# Patient Record
Sex: FEMALE | Race: WHITE | URBAN - METROPOLITAN AREA
[De-identification: names, ages, dates, MRNs, and addresses within clinical notes are randomized per-mention and may not be internally consistent; named-entity substitution may affect disease eponyms.]

---

## 2023-02-16 ENCOUNTER — OFFICE (OUTPATIENT)
Dept: URBAN - METROPOLITAN AREA CLINIC 35 | Facility: CLINIC | Age: 68
Setting detail: OPHTHALMOLOGY
End: 2023-02-16
Payer: MEDICARE

## 2023-02-16 DIAGNOSIS — Z96.1: ICD-10-CM

## 2023-02-16 DIAGNOSIS — H40.013: ICD-10-CM

## 2023-02-16 DIAGNOSIS — H40.053: ICD-10-CM

## 2023-02-16 DIAGNOSIS — H25.12: ICD-10-CM

## 2023-02-16 DIAGNOSIS — H43.391: ICD-10-CM

## 2023-02-16 PROCEDURE — 92014 COMPRE OPH EXAM EST PT 1/>: CPT | Performed by: OPHTHALMOLOGY

## 2023-02-16 ASSESSMENT — REFRACTION_MANIFEST
OS_CYLINDER: -0.50
OS_CYLINDER: -0.75
OD_AXIS: 100
OD_CYLINDER: -0.25
OS_AXIS: 090
OD_SPHERE: -0.50
OS_SPHERE: -0.25
OS_VA1: 20/25+3
OS_SPHERE: +2.25
OD_VA1: 20/NI
OD_AXIS: 85
OD_VA1: 20/20
OS_AXIS: 77
OD_CYLINDER: -1
OS_VA1: 20/25
OD_SPHERE: PLANO

## 2023-02-16 ASSESSMENT — KERATOMETRY
OD_AXISANGLE_DEGREES: 025
OS_K2POWER_DIOPTERS: 41.75
OS_AXISANGLE_DEGREES: 090
OD_K2POWER_DIOPTERS: 41.75
OS_K1POWER_DIOPTERS: 41.75
METHOD_AUTO_MANUAL: AUTO
OD_K1POWER_DIOPTERS: 41.25

## 2023-02-16 ASSESSMENT — SPHEQUIV_DERIVED
OS_SPHEQUIV: 2.75
OS_SPHEQUIV: 1.875
OS_SPHEQUIV: -0.5
OD_SPHEQUIV: -0.875
OD_SPHEQUIV: -1

## 2023-02-16 ASSESSMENT — REFRACTION_CURRENTRX
OD_SPHERE: +2.00
OD_OVR_VA: 20/
OS_SPHERE: +2.00
OD_VPRISM_DIRECTION: SV
OS_VPRISM_DIRECTION: SV
OS_OVR_VA: 20/

## 2023-02-16 ASSESSMENT — AXIALLENGTH_DERIVED
OD_AL: 24.7724
OD_AL: 24.7188
OS_AL: 24.4603
OS_AL: 23.1709
OS_AL: 23.5045

## 2023-02-16 ASSESSMENT — VISUAL ACUITY
OD_BCVA: 20/40-1
OS_BCVA: 20/20

## 2023-02-16 ASSESSMENT — CONFRONTATIONAL VISUAL FIELD TEST (CVF)
OS_FINDINGS: FULL
OD_FINDINGS: FULL

## 2023-02-16 ASSESSMENT — REFRACTION_AUTOREFRACTION
OD_AXIS: 083
OS_AXIS: 084
OS_SPHERE: +3.25
OS_CYLINDER: -1.00
OD_SPHERE: -0.25
OD_CYLINDER: -1.25

## 2023-02-16 ASSESSMENT — TONOMETRY: OD_IOP_MMHG: 20

## 2023-06-22 ENCOUNTER — OFFICE (OUTPATIENT)
Dept: URBAN - METROPOLITAN AREA CLINIC 35 | Facility: CLINIC | Age: 68
Setting detail: OPHTHALMOLOGY
End: 2023-06-22
Payer: MEDICARE

## 2023-06-22 DIAGNOSIS — H25.12: ICD-10-CM

## 2023-06-22 DIAGNOSIS — H40.013: ICD-10-CM

## 2023-06-22 DIAGNOSIS — Z96.1: ICD-10-CM

## 2023-06-22 DIAGNOSIS — H40.053: ICD-10-CM

## 2023-06-22 PROCEDURE — 99213 OFFICE O/P EST LOW 20 MIN: CPT | Performed by: OPHTHALMOLOGY

## 2023-06-22 ASSESSMENT — REFRACTION_AUTOREFRACTION
OD_AXIS: 083
OD_CYLINDER: -1.25
OD_SPHERE: -0.25
OS_CYLINDER: -1.00
OS_SPHERE: +3.25
OS_AXIS: 084

## 2023-06-22 ASSESSMENT — KERATOMETRY
OD_AXISANGLE_DEGREES: 025
OD_K1POWER_DIOPTERS: 41.25
METHOD_AUTO_MANUAL: AUTO
OS_K2POWER_DIOPTERS: 41.75
OS_AXISANGLE_DEGREES: 090
OS_K1POWER_DIOPTERS: 41.75
OD_K2POWER_DIOPTERS: 41.75

## 2023-06-22 ASSESSMENT — REFRACTION_MANIFEST
OS_VA1: 20/20-2
OS_SPHERE: +2.50
OD_CYLINDER: -0.25
OD_AXIS: 100
OS_VA1: 20/25+3
OS_VA1: 20/25
OS_CYLINDER: -0.75
OS_SPHERE: +2.25
OS_AXIS: 77
OS_CYLINDER: -0.50
OD_SPHERE: -0.50
OD_AXIS: 85
OD_CYLINDER: -1
OS_AXIS: 090
OS_SPHERE: -0.25
OS_CYLINDER: -1.00
OS_AXIS: 085
OD_VA1: 20/20
OD_VA1: 20/NI
OD_SPHERE: PLANO

## 2023-06-22 ASSESSMENT — VISUAL ACUITY
OD_BCVA: 20/50-2
OS_BCVA: 20/20-1

## 2023-06-22 ASSESSMENT — AXIALLENGTH_DERIVED
OD_AL: 24.7724
OS_AL: 24.4603
OS_AL: 23.1709
OS_AL: 23.4563
OD_AL: 24.7188
OS_AL: 23.5045

## 2023-06-22 ASSESSMENT — REFRACTION_CURRENTRX
OD_VPRISM_DIRECTION: SV
OS_SPHERE: +2.00
OD_SPHERE: +2.00
OS_OVR_VA: 20/
OD_OVR_VA: 20/
OS_VPRISM_DIRECTION: SV

## 2023-06-22 ASSESSMENT — CONFRONTATIONAL VISUAL FIELD TEST (CVF)
OS_FINDINGS: FULL
OD_FINDINGS: FULL

## 2023-06-22 ASSESSMENT — SPHEQUIV_DERIVED
OS_SPHEQUIV: 2.75
OS_SPHEQUIV: 1.875
OD_SPHEQUIV: -1
OD_SPHEQUIV: -0.875
OS_SPHEQUIV: 2
OS_SPHEQUIV: -0.5

## 2023-10-26 ENCOUNTER — OFFICE (OUTPATIENT)
Dept: URBAN - METROPOLITAN AREA CLINIC 35 | Facility: CLINIC | Age: 68
Setting detail: OPHTHALMOLOGY
End: 2023-10-26
Payer: MEDICARE

## 2023-10-26 DIAGNOSIS — H40.013: ICD-10-CM

## 2023-10-26 DIAGNOSIS — H25.12: ICD-10-CM

## 2023-10-26 DIAGNOSIS — Z96.1: ICD-10-CM

## 2023-10-26 DIAGNOSIS — H40.053: ICD-10-CM

## 2023-10-26 PROBLEM — H52.31 ANISOMETROPIA: Status: ACTIVE | Noted: 2023-10-26

## 2023-10-26 PROCEDURE — 92083 EXTENDED VISUAL FIELD XM: CPT | Performed by: OPHTHALMOLOGY

## 2023-10-26 PROCEDURE — 92012 INTRM OPH EXAM EST PATIENT: CPT | Performed by: OPHTHALMOLOGY

## 2023-10-26 PROCEDURE — 92133 CPTRZD OPH DX IMG PST SGM ON: CPT | Performed by: OPHTHALMOLOGY

## 2023-10-26 ASSESSMENT — REFRACTION_MANIFEST
OS_SPHERE: -0.25
OS_AXIS: 77
OD_AXIS: 85
OS_SPHERE: +2.50
OS_SPHERE: +2.25
OS_AXIS: 085
OS_VA1: 20/25+3
OS_CYLINDER: -0.50
OD_SPHERE: -0.50
OD_SPHERE: PLANO
OS_CYLINDER: -1.00
OS_VA1: 20/20-2
OD_AXIS: 100
OS_CYLINDER: -0.75
OS_VA1: 20/25-2
OS_AXIS: 090
OS_AXIS: 085
OD_CYLINDER: -0.25
OD_VA1: 20/20
OS_SPHERE: +3.25
OD_VA1: 20/NI
OD_CYLINDER: -1
OS_VA1: 20/25
OS_CYLINDER: -1.00

## 2023-10-26 ASSESSMENT — REFRACTION_CURRENTRX
OD_SPHERE: +2.00
OS_SPHERE: +2.00
OD_VPRISM_DIRECTION: SV
OD_OVR_VA: 20/
OS_VPRISM_DIRECTION: SV
OS_OVR_VA: 20/

## 2023-10-26 ASSESSMENT — REFRACTION_AUTOREFRACTION
OD_AXIS: 083
OS_AXIS: 084
OD_CYLINDER: -1.25
OS_CYLINDER: -1.00
OD_SPHERE: -0.25
OS_SPHERE: +3.25

## 2023-10-26 ASSESSMENT — AXIALLENGTH_DERIVED
OD_AL: 24.7188
OS_AL: 23.5045
OS_AL: 23.1709
OS_AL: 23.4563
OS_AL: 24.4603
OD_AL: 24.7724
OS_AL: 23.1709

## 2023-10-26 ASSESSMENT — KERATOMETRY
OD_K2POWER_DIOPTERS: 41.75
OS_K1POWER_DIOPTERS: 41.75
METHOD_AUTO_MANUAL: AUTO
OD_AXISANGLE_DEGREES: 025
OS_K2POWER_DIOPTERS: 41.75
OD_K1POWER_DIOPTERS: 41.25
OS_AXISANGLE_DEGREES: 090

## 2023-10-26 ASSESSMENT — VISUAL ACUITY
OD_BCVA: 20/80
OS_BCVA: 20/20-1

## 2023-10-26 ASSESSMENT — SPHEQUIV_DERIVED
OS_SPHEQUIV: -0.5
OD_SPHEQUIV: -1
OS_SPHEQUIV: 2.75
OS_SPHEQUIV: 1.875
OS_SPHEQUIV: 2
OS_SPHEQUIV: 2.75
OD_SPHEQUIV: -0.875

## 2023-11-15 ENCOUNTER — NON-APPOINTMENT (OUTPATIENT)
Age: 68
End: 2023-11-15

## 2023-11-15 DIAGNOSIS — Z98.890 OTHER SPECIFIED POSTPROCEDURAL STATES: ICD-10-CM

## 2023-11-15 DIAGNOSIS — Z78.9 OTHER SPECIFIED HEALTH STATUS: ICD-10-CM

## 2023-11-15 DIAGNOSIS — Z80.3 FAMILY HISTORY OF MALIGNANT NEOPLASM OF BREAST: ICD-10-CM

## 2023-11-15 DIAGNOSIS — H40.9 UNSPECIFIED GLAUCOMA: ICD-10-CM

## 2023-11-15 DIAGNOSIS — Z92.89 PERSONAL HISTORY OF OTHER MEDICAL TREATMENT: ICD-10-CM

## 2023-11-15 DIAGNOSIS — Z87.81 PERSONAL HISTORY OF (HEALED) TRAUMATIC FRACTURE: ICD-10-CM

## 2023-11-15 RX ORDER — CANDESARTAN CILEXETIL AND HYDROCHLOROTHIAZIDE 32; 12.5 MG/1; MG/1
32-12.5 TABLET ORAL DAILY
Refills: 0 | Status: ACTIVE | COMMUNITY

## 2023-12-04 ENCOUNTER — APPOINTMENT (OUTPATIENT)
Dept: INTERNAL MEDICINE | Facility: CLINIC | Age: 68
End: 2023-12-04

## 2024-02-02 ENCOUNTER — LABORATORY RESULT (OUTPATIENT)
Age: 69
End: 2024-02-02

## 2024-02-02 ENCOUNTER — NON-APPOINTMENT (OUTPATIENT)
Age: 69
End: 2024-02-02

## 2024-02-02 ENCOUNTER — APPOINTMENT (OUTPATIENT)
Dept: INTERNAL MEDICINE | Facility: CLINIC | Age: 69
End: 2024-02-02
Payer: MEDICARE

## 2024-02-02 VITALS
WEIGHT: 145.25 LBS | DIASTOLIC BLOOD PRESSURE: 89 MMHG | SYSTOLIC BLOOD PRESSURE: 134 MMHG | BODY MASS INDEX: 29.28 KG/M2 | HEART RATE: 68 BPM | OXYGEN SATURATION: 96 % | HEIGHT: 59 IN

## 2024-02-02 DIAGNOSIS — I65.29 OCCLUSION AND STENOSIS OF UNSPECIFIED CAROTID ARTERY: ICD-10-CM

## 2024-02-02 DIAGNOSIS — Z00.00 ENCOUNTER FOR GENERAL ADULT MEDICAL EXAMINATION W/OUT ABNORMAL FINDINGS: ICD-10-CM

## 2024-02-02 PROCEDURE — 99213 OFFICE O/P EST LOW 20 MIN: CPT | Mod: 25

## 2024-02-02 PROCEDURE — G0439: CPT

## 2024-02-02 PROCEDURE — G0444 DEPRESSION SCREEN ANNUAL: CPT | Mod: 59

## 2024-02-02 PROCEDURE — 93000 ELECTROCARDIOGRAM COMPLETE: CPT | Mod: 59

## 2024-02-02 NOTE — HEALTH RISK ASSESSMENT
[Yes] : Yes [2 - 4 times a month (2 pts)] : 2-4 times a month (2 points) [Never (0 pts)] : Never (0 points) [No falls in past year] : Patient reported no falls in the past year [0] : 2) Feeling down, depressed, or hopeless: Not at all (0) [PHQ-2 Negative - No further assessment needed] : PHQ-2 Negative - No further assessment needed [With Significant Other] : lives with significant other [Never] : Never [de-identified] : 5 min [Change in mental status noted] : No change in mental status noted [MammogramDate] : 12/23 [PapSmearDate] : 8/23 [BoneDensityDate] : 8/23 [ColonoscopyDate] : 2019 [de-identified] : retired

## 2024-02-02 NOTE — ASSESSMENT
[FreeTextEntry1] : Diet and exercise Check complete blood work Continue medication Mammo Pap breast self-exam Bone density colonoscopy ophthalmology vitamin D Up-to-date on vaccines Depression screen done and reviewed 5 minutes For hypertension blood pressure satisfactory continue medication check blood pressure at home Follow-up 3 months

## 2024-02-03 LAB
25(OH)D3 SERPL-MCNC: 34.7 NG/ML
ALBUMIN SERPL ELPH-MCNC: 4.6 G/DL
ALP BLD-CCNC: 72 U/L
ALT SERPL-CCNC: 15 U/L
ANION GAP SERPL CALC-SCNC: 12 MMOL/L
APPEARANCE: CLEAR
AST SERPL-CCNC: 20 U/L
BASOPHILS # BLD AUTO: 0.04 K/UL
BASOPHILS NFR BLD AUTO: 0.8 %
BILIRUB SERPL-MCNC: 0.5 MG/DL
BILIRUBIN URINE: NEGATIVE
BLOOD URINE: NEGATIVE
BUN SERPL-MCNC: 25 MG/DL
CALCIUM SERPL-MCNC: 9.8 MG/DL
CHLORIDE SERPL-SCNC: 100 MMOL/L
CHOLEST SERPL-MCNC: 200 MG/DL
CO2 SERPL-SCNC: 26 MMOL/L
COLOR: YELLOW
CREAT SERPL-MCNC: 0.84 MG/DL
EGFR: 75 ML/MIN/1.73M2
EOSINOPHIL # BLD AUTO: 0.12 K/UL
EOSINOPHIL NFR BLD AUTO: 2.5 %
ESTIMATED AVERAGE GLUCOSE: 105 MG/DL
GLUCOSE QUALITATIVE U: NEGATIVE MG/DL
GLUCOSE SERPL-MCNC: 92 MG/DL
HBA1C MFR BLD HPLC: 5.3 %
HCT VFR BLD CALC: 45.1 %
HDLC SERPL-MCNC: 61 MG/DL
HGB BLD-MCNC: 14.9 G/DL
IMM GRANULOCYTES NFR BLD AUTO: 0.2 %
KETONES URINE: NEGATIVE MG/DL
LDLC SERPL CALC-MCNC: 125 MG/DL
LEUKOCYTE ESTERASE URINE: ABNORMAL
LYMPHOCYTES # BLD AUTO: 1.65 K/UL
LYMPHOCYTES NFR BLD AUTO: 33.8 %
MAN DIFF?: NORMAL
MCHC RBC-ENTMCNC: 30 PG
MCHC RBC-ENTMCNC: 33 GM/DL
MCV RBC AUTO: 90.9 FL
MONOCYTES # BLD AUTO: 0.58 K/UL
MONOCYTES NFR BLD AUTO: 11.9 %
NEUTROPHILS # BLD AUTO: 2.48 K/UL
NEUTROPHILS NFR BLD AUTO: 50.8 %
NITRITE URINE: NEGATIVE
NONHDLC SERPL-MCNC: 140 MG/DL
PH URINE: 6
PLATELET # BLD AUTO: 267 K/UL
POTASSIUM SERPL-SCNC: 4.2 MMOL/L
PROT SERPL-MCNC: 7.7 G/DL
PROTEIN URINE: NEGATIVE MG/DL
RBC # BLD: 4.96 M/UL
RBC # FLD: 14.1 %
SODIUM SERPL-SCNC: 138 MMOL/L
SPECIFIC GRAVITY URINE: 1.02
TRIGL SERPL-MCNC: 82 MG/DL
TSH SERPL-ACNC: 2.36 UIU/ML
UROBILINOGEN URINE: 0.2 MG/DL
WBC # FLD AUTO: 4.88 K/UL

## 2024-02-27 ENCOUNTER — OFFICE (OUTPATIENT)
Dept: URBAN - METROPOLITAN AREA CLINIC 35 | Facility: CLINIC | Age: 69
Setting detail: OPHTHALMOLOGY
End: 2024-02-27
Payer: MEDICARE

## 2024-02-27 DIAGNOSIS — H40.013: ICD-10-CM

## 2024-02-27 DIAGNOSIS — H40.053: ICD-10-CM

## 2024-02-27 DIAGNOSIS — Z96.1: ICD-10-CM

## 2024-02-27 DIAGNOSIS — H43.391: ICD-10-CM

## 2024-02-27 DIAGNOSIS — H25.12: ICD-10-CM

## 2024-02-27 PROCEDURE — 92014 COMPRE OPH EXAM EST PT 1/>: CPT | Performed by: OPHTHALMOLOGY

## 2024-02-27 ASSESSMENT — SPHEQUIV_DERIVED
OS_SPHEQUIV: 2.875
OS_SPHEQUIV: -0.5
OD_SPHEQUIV: -1
OS_SPHEQUIV: 2.875
OS_SPHEQUIV: 2
OD_SPHEQUIV: -0.875
OS_SPHEQUIV: 1.875
OS_SPHEQUIV: 2.75

## 2024-02-27 ASSESSMENT — REFRACTION_CURRENTRX
OD_OVR_VA: 20/
OS_SPHERE: +2.00
OD_VPRISM_DIRECTION: SV
OD_SPHERE: +2.00
OS_VPRISM_DIRECTION: SV
OS_OVR_VA: 20/

## 2024-02-27 ASSESSMENT — REFRACTION_MANIFEST
OS_VA1: 20/25-2
OS_CYLINDER: -0.50
OS_VA1: 20/20-2
OS_SPHERE: +3.75
OS_CYLINDER: -1.00
OS_AXIS: 085
OD_CYLINDER: -1
OS_CYLINDER: -1.00
OS_SPHERE: +2.25
OS_VA1: 20/30-
OS_AXIS: 090
OS_VA1: 20/25+3
OS_SPHERE: +2.50
OD_AXIS: 85
OD_SPHERE: PLANO
OS_SPHERE: -0.25
OD_CYLINDER: -0.25
OD_VA1: 20/20
OS_CYLINDER: -0.75
OS_AXIS: 77
OS_AXIS: 085
OD_AXIS: 100
OD_VA1: 20/NI
OD_SPHERE: -0.50
OS_AXIS: 075
OS_VA1: 20/25
OS_CYLINDER: -1.75
OS_SPHERE: +3.25

## 2024-02-27 ASSESSMENT — REFRACTION_AUTOREFRACTION
OD_SPHERE: -0.25
OD_AXIS: 083
OS_CYLINDER: -1.75
OS_AXIS: 081
OS_SPHERE: +3.75
OD_CYLINDER: -1.25

## 2024-03-30 ENCOUNTER — RX RENEWAL (OUTPATIENT)
Age: 69
End: 2024-03-30

## 2024-03-30 RX ORDER — OLMESARTAN MEDOXOMIL AND HYDROCHLOROTHIAZIDE 40; 12.5 MG/1; MG/1
40-12.5 TABLET ORAL
Qty: 90 | Refills: 3 | Status: ACTIVE | COMMUNITY
Start: 2024-03-30 | End: 1900-01-01

## 2024-05-03 ENCOUNTER — TRANSCRIPTION ENCOUNTER (OUTPATIENT)
Age: 69
End: 2024-05-03

## 2024-05-03 ENCOUNTER — APPOINTMENT (OUTPATIENT)
Dept: INTERNAL MEDICINE | Facility: CLINIC | Age: 69
End: 2024-05-03
Payer: MEDICARE

## 2024-05-03 VITALS
TEMPERATURE: 97.6 F | SYSTOLIC BLOOD PRESSURE: 129 MMHG | HEIGHT: 60 IN | BODY MASS INDEX: 27.88 KG/M2 | DIASTOLIC BLOOD PRESSURE: 85 MMHG | HEART RATE: 68 BPM | WEIGHT: 142 LBS | OXYGEN SATURATION: 99 %

## 2024-05-03 DIAGNOSIS — I10 ESSENTIAL (PRIMARY) HYPERTENSION: ICD-10-CM

## 2024-05-03 PROCEDURE — 99213 OFFICE O/P EST LOW 20 MIN: CPT

## 2024-05-03 RX ORDER — OLMESARTAN MEDOXOMIL AND HYDROCHLOROTHIAZIDE 40; 12.5 MG/1; MG/1
40-12.5 TABLET ORAL DAILY
Qty: 90 | Refills: 3 | Status: ACTIVE | COMMUNITY
Start: 1900-01-01 | End: 1900-01-01

## 2024-05-03 RX ORDER — OLMESARTAN MEDOXOMIL 40 MG/1
40 TABLET, FILM COATED ORAL DAILY
Refills: 0 | Status: DISCONTINUED | COMMUNITY
End: 2024-05-03

## 2024-05-03 NOTE — HEALTH RISK ASSESSMENT
[Yes] : Yes [2 - 4 times a month (2 pts)] : 2-4 times a month (2 points) [Never (0 pts)] : Never (0 points) [No falls in past year] : Patient reported no falls in the past year [0] : 2) Feeling down, depressed, or hopeless: Not at all (0) [PHQ-2 Negative - No further assessment needed] : PHQ-2 Negative - No further assessment needed [Never] : Never [With Significant Other] : lives with significant other [de-identified] : 5 min [Change in mental status noted] : No change in mental status noted [PapSmearDate] : 8/23 [MammogramDate] : 12/23 [BoneDensityDate] : 8/23 [ColonoscopyDate] : 2019 [de-identified] : retired

## 2024-05-03 NOTE — HISTORY OF PRESENT ILLNESS
[de-identified] : 68-year-old white female presents for follow-up of hypertension patient denies chest pain shortness of breath headache fever chills abdominal pain overall she is feeling well

## 2024-05-03 NOTE — ASSESSMENT
[FreeTextEntry1] : Diet and exercise Continue Benicar HCT Blood pressure satisfactory Follow-up 3 months

## 2024-07-05 ENCOUNTER — OFFICE (OUTPATIENT)
Dept: URBAN - METROPOLITAN AREA CLINIC 35 | Facility: CLINIC | Age: 69
Setting detail: OPHTHALMOLOGY
End: 2024-07-05
Payer: MEDICARE

## 2024-07-05 DIAGNOSIS — H02.834: ICD-10-CM

## 2024-07-05 DIAGNOSIS — H52.31: ICD-10-CM

## 2024-07-05 DIAGNOSIS — H25.12: ICD-10-CM

## 2024-07-05 DIAGNOSIS — H43.391: ICD-10-CM

## 2024-07-05 DIAGNOSIS — Z96.1: ICD-10-CM

## 2024-07-05 DIAGNOSIS — H40.013: ICD-10-CM

## 2024-07-05 DIAGNOSIS — H40.053: ICD-10-CM

## 2024-07-05 DIAGNOSIS — H02.831: ICD-10-CM

## 2024-07-05 PROCEDURE — 92012 INTRM OPH EXAM EST PATIENT: CPT | Performed by: OPHTHALMOLOGY

## 2024-07-05 ASSESSMENT — LID POSITION - DERMATOCHALASIS
OS_DERMATOCHALASIS: LUL 1+
OD_DERMATOCHALASIS: RUL 1+

## 2024-08-02 ENCOUNTER — APPOINTMENT (OUTPATIENT)
Dept: INTERNAL MEDICINE | Facility: CLINIC | Age: 69
End: 2024-08-02
Payer: MEDICARE

## 2024-08-02 VITALS
HEIGHT: 60 IN | OXYGEN SATURATION: 100 % | SYSTOLIC BLOOD PRESSURE: 136 MMHG | HEART RATE: 63 BPM | BODY MASS INDEX: 27.48 KG/M2 | WEIGHT: 140 LBS | DIASTOLIC BLOOD PRESSURE: 85 MMHG

## 2024-08-02 DIAGNOSIS — I10 ESSENTIAL (PRIMARY) HYPERTENSION: ICD-10-CM

## 2024-08-02 DIAGNOSIS — H61.20 IMPACTED CERUMEN, UNSPECIFIED EAR: ICD-10-CM

## 2024-08-02 PROCEDURE — 99213 OFFICE O/P EST LOW 20 MIN: CPT

## 2024-08-02 PROCEDURE — G2211 COMPLEX E/M VISIT ADD ON: CPT

## 2024-08-02 NOTE — ASSESSMENT
[FreeTextEntry1] : Diet and exercise Hypertension continue olmesartan blood pressure satisfactory Low-sodium diet Cerumen impaction refer to ENT for removal Follow-up 3 months

## 2024-08-02 NOTE — PHYSICAL EXAM
[No Acute Distress] : no acute distress [Well Nourished] : well nourished [Well Developed] : well developed [Well-Appearing] : well-appearing [Normal Sclera/Conjunctiva] : normal sclera/conjunctiva [PERRL] : pupils equal round and reactive to light [EOMI] : extraocular movements intact [Normal Outer Ear/Nose] : the outer ears and nose were normal in appearance [Normal Oropharynx] : the oropharynx was normal [No JVD] : no jugular venous distention [No Lymphadenopathy] : no lymphadenopathy [Supple] : supple [Thyroid Normal, No Nodules] : the thyroid was normal and there were no nodules present [No Respiratory Distress] : no respiratory distress  [No Accessory Muscle Use] : no accessory muscle use [Clear to Auscultation] : lungs were clear to auscultation bilaterally [Normal Rate] : normal rate  [Regular Rhythm] : with a regular rhythm [Normal S1, S2] : normal S1 and S2 [No Murmur] : no murmur heard [No Carotid Bruits] : no carotid bruits [No Varicosities] : no varicosities [No Edema] : there was no peripheral edema [No Extremity Clubbing/Cyanosis] : no extremity clubbing/cyanosis [Soft] : abdomen soft [Non Tender] : non-tender [Non-distended] : non-distended [No Masses] : no abdominal mass palpated [No HSM] : no HSM [Normal Bowel Sounds] : normal bowel sounds [Normal Posterior Cervical Nodes] : no posterior cervical lymphadenopathy [Normal Anterior Cervical Nodes] : no anterior cervical lymphadenopathy [No CVA Tenderness] : no CVA  tenderness [No Spinal Tenderness] : no spinal tenderness [No Joint Swelling] : no joint swelling [Grossly Normal Strength/Tone] : grossly normal strength/tone [No Rash] : no rash [Coordination Grossly Intact] : coordination grossly intact [No Focal Deficits] : no focal deficits [Normal Gait] : normal gait [Normal Affect] : the affect was normal [Normal Insight/Judgement] : insight and judgment were intact [de-identified] : Right TM obstructed by cerumen

## 2024-08-02 NOTE — HISTORY OF PRESENT ILLNESS
[de-identified] : 69-year-old white female presents for follow-up of hypertension patient denies chest pain shortness of breath headache fever chills abdominal pain she complains of right ear discomfort for the past several days

## 2024-08-02 NOTE — HEALTH RISK ASSESSMENT
[Yes] : Yes [2 - 4 times a month (2 pts)] : 2-4 times a month (2 points) [Never (0 pts)] : Never (0 points) [No falls in past year] : Patient reported no falls in the past year [0] : 2) Feeling down, depressed, or hopeless: Not at all (0) [PHQ-2 Negative - No further assessment needed] : PHQ-2 Negative - No further assessment needed [Never] : Never [With Significant Other] : lives with significant other [de-identified] : 5 min [Change in mental status noted] : No change in mental status noted [MammogramDate] : 12/23 [PapSmearDate] : 8/23 [BoneDensityDate] : 8/23 [ColonoscopyDate] : 2019 [de-identified] : retired

## 2024-11-07 ENCOUNTER — DOCTOR'S OFFICE (OUTPATIENT)
Facility: LOCATION | Age: 69
Setting detail: OPHTHALMOLOGY
End: 2024-11-07
Payer: MEDICARE

## 2024-11-07 DIAGNOSIS — H02.834: ICD-10-CM

## 2024-11-07 DIAGNOSIS — Z96.1: ICD-10-CM

## 2024-11-07 DIAGNOSIS — H40.053: ICD-10-CM

## 2024-11-07 DIAGNOSIS — H25.12: ICD-10-CM

## 2024-11-07 DIAGNOSIS — H02.831: ICD-10-CM

## 2024-11-07 DIAGNOSIS — H43.391: ICD-10-CM

## 2024-11-07 DIAGNOSIS — H40.013: ICD-10-CM

## 2024-11-07 DIAGNOSIS — H52.31: ICD-10-CM

## 2024-11-07 PROCEDURE — 92133 CPTRZD OPH DX IMG PST SGM ON: CPT | Performed by: OPHTHALMOLOGY

## 2024-11-07 PROCEDURE — 92014 COMPRE OPH EXAM EST PT 1/>: CPT | Performed by: OPHTHALMOLOGY

## 2024-11-07 ASSESSMENT — VISUAL ACUITY
OS_BCVA: 20/20
OD_BCVA: 20/60-2

## 2024-11-07 ASSESSMENT — REFRACTION_MANIFEST
OS_CYLINDER: -1.75
OS_SPHERE: +3.75
OD_CYLINDER: -1
OS_SPHERE: +2.50
OS_CYLINDER: -0.75
OS_AXIS: 085
OD_VA1: 20/NI
OS_VA1: 20/25
OS_SPHERE: +3.25
OD_AXIS: 100
OS_SPHERE: +2.25
OD_CYLINDER: -0.25
OD_AXIS: 85
OS_AXIS: 085
OS_CYLINDER: -1.00
OS_VA1: 20/30+3
OD_SPHERE: -0.50
OS_AXIS: 075
OS_CYLINDER: -0.50
OS_CYLINDER: -1.00
OS_VA1: 20/30+3
OS_VA1: 20/25-2
OS_SPHERE: +3.75
OD_VA1: 20/20
OS_SPHERE: -0.25
OS_VA1: 20/25+3
OD_SPHERE: PLANO
OS_CYLINDER: -1.75
OS_AXIS: 075
OS_VA1: 20/20-2
OS_AXIS: 77
OS_AXIS: 090

## 2024-11-07 ASSESSMENT — CONFRONTATIONAL VISUAL FIELD TEST (CVF)
OS_FINDINGS: FULL
OD_FINDINGS: FULL

## 2024-11-07 ASSESSMENT — KERATOMETRY
OD_K1POWER_DIOPTERS: 41.25
METHOD_AUTO_MANUAL: AUTO
OD_K2POWER_DIOPTERS: 41.75
OD_AXISANGLE_DEGREES: 015
OS_K2POWER_DIOPTERS: 41.75
OS_AXISANGLE_DEGREES: 090
OS_K1POWER_DIOPTERS: 41.75

## 2024-11-07 ASSESSMENT — LID POSITION - DERMATOCHALASIS
OD_DERMATOCHALASIS: RUL 1+
OS_DERMATOCHALASIS: LUL 1+

## 2024-11-07 ASSESSMENT — REFRACTION_AUTOREFRACTION
OS_SPHERE: +3.75
OD_CYLINDER: -1.25
OS_AXIS: 081
OS_CYLINDER: -1.75
OD_AXIS: 083
OD_SPHERE: -0.25

## 2024-11-07 ASSESSMENT — REFRACTION_CURRENTRX
OS_VPRISM_DIRECTION: SV
OS_OVR_VA: 20/
OD_SPHERE: +2.00
OD_VPRISM_DIRECTION: SV
OD_OVR_VA: 20/
OS_SPHERE: +2.00

## 2024-11-15 ENCOUNTER — APPOINTMENT (OUTPATIENT)
Dept: INTERNAL MEDICINE | Facility: CLINIC | Age: 69
End: 2024-11-15
Payer: MEDICARE

## 2024-11-15 VITALS
WEIGHT: 140 LBS | SYSTOLIC BLOOD PRESSURE: 126 MMHG | HEART RATE: 78 BPM | OXYGEN SATURATION: 98 % | BODY MASS INDEX: 27.48 KG/M2 | DIASTOLIC BLOOD PRESSURE: 83 MMHG | HEIGHT: 60 IN | TEMPERATURE: 98 F

## 2024-11-15 DIAGNOSIS — Z23 ENCOUNTER FOR IMMUNIZATION: ICD-10-CM

## 2024-11-15 DIAGNOSIS — I10 ESSENTIAL (PRIMARY) HYPERTENSION: ICD-10-CM

## 2024-11-15 PROCEDURE — G2211 COMPLEX E/M VISIT ADD ON: CPT

## 2024-11-15 PROCEDURE — 90677 PCV20 VACCINE IM: CPT

## 2024-11-15 PROCEDURE — G0009: CPT

## 2024-11-15 PROCEDURE — 99213 OFFICE O/P EST LOW 20 MIN: CPT

## 2025-02-21 ENCOUNTER — APPOINTMENT (OUTPATIENT)
Dept: INTERNAL MEDICINE | Facility: CLINIC | Age: 70
End: 2025-02-21
Payer: MEDICARE

## 2025-02-21 ENCOUNTER — NON-APPOINTMENT (OUTPATIENT)
Age: 70
End: 2025-02-21

## 2025-02-21 VITALS
HEART RATE: 78 BPM | WEIGHT: 144 LBS | TEMPERATURE: 98 F | OXYGEN SATURATION: 98 % | DIASTOLIC BLOOD PRESSURE: 78 MMHG | SYSTOLIC BLOOD PRESSURE: 124 MMHG | BODY MASS INDEX: 28.27 KG/M2 | HEIGHT: 60 IN

## 2025-02-21 DIAGNOSIS — I10 ESSENTIAL (PRIMARY) HYPERTENSION: ICD-10-CM

## 2025-02-21 DIAGNOSIS — E78.5 HYPERLIPIDEMIA, UNSPECIFIED: ICD-10-CM

## 2025-02-21 DIAGNOSIS — Z00.00 ENCOUNTER FOR GENERAL ADULT MEDICAL EXAMINATION W/OUT ABNORMAL FINDINGS: ICD-10-CM

## 2025-02-21 PROCEDURE — G0439: CPT

## 2025-02-21 PROCEDURE — 93000 ELECTROCARDIOGRAM COMPLETE: CPT | Mod: 59

## 2025-02-21 PROCEDURE — G0444 DEPRESSION SCREEN ANNUAL: CPT

## 2025-02-22 LAB
25(OH)D3 SERPL-MCNC: 32.6 NG/ML
ALBUMIN SERPL ELPH-MCNC: 4.5 G/DL
ALP BLD-CCNC: 77 U/L
ALT SERPL-CCNC: 14 U/L
ANION GAP SERPL CALC-SCNC: 17 MMOL/L
APPEARANCE: CLEAR
AST SERPL-CCNC: 21 U/L
BASOPHILS # BLD AUTO: 0.04 K/UL
BASOPHILS NFR BLD AUTO: 0.7 %
BILIRUB SERPL-MCNC: 0.4 MG/DL
BILIRUBIN URINE: NEGATIVE
BLOOD URINE: NEGATIVE
BUN SERPL-MCNC: 21 MG/DL
CALCIUM SERPL-MCNC: 10 MG/DL
CHLORIDE SERPL-SCNC: 102 MMOL/L
CHOLEST SERPL-MCNC: 200 MG/DL
CO2 SERPL-SCNC: 24 MMOL/L
COLOR: YELLOW
CREAT SERPL-MCNC: 0.83 MG/DL
EGFR: 75 ML/MIN/1.73M2
EOSINOPHIL # BLD AUTO: 0.12 K/UL
EOSINOPHIL NFR BLD AUTO: 2.1 %
ESTIMATED AVERAGE GLUCOSE: 117 MG/DL
GLUCOSE QUALITATIVE U: NEGATIVE MG/DL
GLUCOSE SERPL-MCNC: 104 MG/DL
HBA1C MFR BLD HPLC: 5.7 %
HCT VFR BLD CALC: 44.1 %
HCV AB SER QL: NONREACTIVE
HCV S/CO RATIO: 0.14 S/CO
HDLC SERPL-MCNC: 62 MG/DL
HGB BLD-MCNC: 14 G/DL
IMM GRANULOCYTES NFR BLD AUTO: 0.2 %
KETONES URINE: NEGATIVE MG/DL
LDLC SERPL CALC-MCNC: 125 MG/DL
LEUKOCYTE ESTERASE URINE: NEGATIVE
LYMPHOCYTES # BLD AUTO: 1.59 K/UL
LYMPHOCYTES NFR BLD AUTO: 27.3 %
MAN DIFF?: NORMAL
MCHC RBC-ENTMCNC: 28.5 PG
MCHC RBC-ENTMCNC: 31.7 G/DL
MCV RBC AUTO: 89.6 FL
MONOCYTES # BLD AUTO: 0.61 K/UL
MONOCYTES NFR BLD AUTO: 10.5 %
NEUTROPHILS # BLD AUTO: 3.45 K/UL
NEUTROPHILS NFR BLD AUTO: 59.2 %
NITRITE URINE: NEGATIVE
NONHDLC SERPL-MCNC: 138 MG/DL
PH URINE: 7
PLATELET # BLD AUTO: 266 K/UL
POTASSIUM SERPL-SCNC: 3.9 MMOL/L
PROT SERPL-MCNC: 7.6 G/DL
PROTEIN URINE: NEGATIVE MG/DL
RBC # BLD: 4.92 M/UL
RBC # FLD: 14 %
SODIUM SERPL-SCNC: 144 MMOL/L
SPECIFIC GRAVITY URINE: 1.01
TRIGL SERPL-MCNC: 71 MG/DL
TSH SERPL-ACNC: 2.57 UIU/ML
UROBILINOGEN URINE: 0.2 MG/DL
WBC # FLD AUTO: 5.82 K/UL

## 2025-03-06 ENCOUNTER — DOCTOR'S OFFICE (OUTPATIENT)
Facility: LOCATION | Age: 70
Setting detail: OPHTHALMOLOGY
End: 2025-03-06
Payer: MEDICARE

## 2025-03-06 DIAGNOSIS — H02.834: ICD-10-CM

## 2025-03-06 DIAGNOSIS — H40.013: ICD-10-CM

## 2025-03-06 DIAGNOSIS — H02.831: ICD-10-CM

## 2025-03-06 DIAGNOSIS — H43.391: ICD-10-CM

## 2025-03-06 DIAGNOSIS — H40.053: ICD-10-CM

## 2025-03-06 PROCEDURE — 92014 COMPRE OPH EXAM EST PT 1/>: CPT | Performed by: OPHTHALMOLOGY

## 2025-03-06 PROCEDURE — 92083 EXTENDED VISUAL FIELD XM: CPT | Performed by: OPHTHALMOLOGY

## 2025-03-06 ASSESSMENT — KERATOMETRY
OD_AXISANGLE_DEGREES: 015
OS_K1POWER_DIOPTERS: 41.75
METHOD_AUTO_MANUAL: AUTO
OS_K2POWER_DIOPTERS: 41.75
OS_AXISANGLE_DEGREES: 090
OD_K1POWER_DIOPTERS: 41.25
OD_K2POWER_DIOPTERS: 41.75

## 2025-03-06 ASSESSMENT — REFRACTION_CURRENTRX
OD_SPHERE: +2.00
OD_VPRISM_DIRECTION: SV
OD_OVR_VA: 20/
OS_VPRISM_DIRECTION: SV
OS_OVR_VA: 20/
OS_SPHERE: +2.00

## 2025-03-06 ASSESSMENT — REFRACTION_MANIFEST
OS_VA1: 20/30+3
OS_CYLINDER: -0.50
OS_VA1: 20/25-2
OS_AXIS: 075
OS_CYLINDER: -1.75
OS_AXIS: 085
OS_VA1: 20/20-2
OD_VA1: 20/20
OS_SPHERE: +2.50
OD_CYLINDER: -0.25
OS_AXIS: 075
OS_SPHERE: +3.75
OD_SPHERE: -0.50
OD_AXIS: 100
OS_VA1: 20/25+3
OD_VA1: 20/NI
OS_CYLINDER: -1.00
OS_CYLINDER: -0.75
OS_CYLINDER: -1.75
OS_AXIS: 090
OD_SPHERE: PLANO
OS_AXIS: 085
OD_CYLINDER: -1
OS_SPHERE: -0.25
OS_VA1: 20/25
OD_AXIS: 85
OS_SPHERE: +2.25
OS_SPHERE: +3.75
OS_CYLINDER: -1.00
OS_VA1: 20/30+3
OS_AXIS: 77
OS_SPHERE: +3.25

## 2025-03-06 ASSESSMENT — VISUAL ACUITY
OS_BCVA: 20/20
OD_BCVA: 20/30

## 2025-03-06 ASSESSMENT — CONFRONTATIONAL VISUAL FIELD TEST (CVF)
OD_FINDINGS: FULL
OS_FINDINGS: FULL

## 2025-03-06 ASSESSMENT — LID POSITION - DERMATOCHALASIS
OD_DERMATOCHALASIS: RUL 1+
OS_DERMATOCHALASIS: LUL 1+

## 2025-03-06 ASSESSMENT — TONOMETRY: OD_IOP_MMHG: 20

## 2025-03-06 ASSESSMENT — REFRACTION_AUTOREFRACTION
OD_SPHERE: -0.25
OS_SPHERE: +3.75
OD_CYLINDER: -1.25
OS_CYLINDER: -1.75
OD_AXIS: 083
OS_AXIS: 081

## 2025-03-15 PROBLEM — Q10.3 PSEUDOSTRABISMUS: Status: ACTIVE | Noted: 2025-03-15

## 2025-05-23 ENCOUNTER — APPOINTMENT (OUTPATIENT)
Dept: INTERNAL MEDICINE | Facility: CLINIC | Age: 70
End: 2025-05-23
Payer: MEDICARE

## 2025-05-23 VITALS
HEIGHT: 60 IN | DIASTOLIC BLOOD PRESSURE: 81 MMHG | HEART RATE: 74 BPM | OXYGEN SATURATION: 97 % | SYSTOLIC BLOOD PRESSURE: 113 MMHG | BODY MASS INDEX: 28.27 KG/M2 | WEIGHT: 144 LBS

## 2025-05-23 DIAGNOSIS — E78.5 HYPERLIPIDEMIA, UNSPECIFIED: ICD-10-CM

## 2025-05-23 DIAGNOSIS — I10 ESSENTIAL (PRIMARY) HYPERTENSION: ICD-10-CM

## 2025-05-23 PROCEDURE — G2211 COMPLEX E/M VISIT ADD ON: CPT

## 2025-05-23 PROCEDURE — 99214 OFFICE O/P EST MOD 30 MIN: CPT

## 2025-07-10 ENCOUNTER — RX ONLY (RX ONLY)
Age: 70
End: 2025-07-10

## 2025-07-10 ENCOUNTER — DOCTOR'S OFFICE (OUTPATIENT)
Facility: LOCATION | Age: 70
Setting detail: OPHTHALMOLOGY
End: 2025-07-10
Payer: MEDICARE

## 2025-07-10 DIAGNOSIS — H43.391: ICD-10-CM

## 2025-07-10 DIAGNOSIS — H11.152: ICD-10-CM

## 2025-07-10 DIAGNOSIS — H02.834: ICD-10-CM

## 2025-07-10 DIAGNOSIS — H25.12: ICD-10-CM

## 2025-07-10 DIAGNOSIS — Z96.1: ICD-10-CM

## 2025-07-10 DIAGNOSIS — H52.31: ICD-10-CM

## 2025-07-10 DIAGNOSIS — H40.053: ICD-10-CM

## 2025-07-10 DIAGNOSIS — H02.831: ICD-10-CM

## 2025-07-10 DIAGNOSIS — H40.013: ICD-10-CM

## 2025-07-10 PROCEDURE — 92014 COMPRE OPH EXAM EST PT 1/>: CPT | Performed by: OPHTHALMOLOGY

## 2025-07-10 ASSESSMENT — REFRACTION_MANIFEST
OS_CYLINDER: -1.75
OS_VA1: 20/20-2
OS_AXIS: 77
OD_AXIS: 85
OD_AXIS: 100
OS_CYLINDER: -1.00
OS_SPHERE: +3.25
OS_AXIS: 075
OD_VA1: 20/20
OS_VA1: 20/25-2
OS_SPHERE: +3.75
OS_VA1: 20/30+3
OS_CYLINDER: -0.75
OD_SPHERE: PLANO
OS_AXIS: 075
OS_VA1: 20/30+3
OD_CYLINDER: -1
OS_SPHERE: +3.75
OS_SPHERE: +2.50
OS_SPHERE: -0.25
OD_SPHERE: -0.50
OS_VA1: 20/25
OS_AXIS: 085
OS_CYLINDER: -0.50
OD_VA1: 20/NI
OS_AXIS: 085
OD_CYLINDER: -0.25
OS_VA1: 20/25+3
OS_CYLINDER: -1.75
OS_CYLINDER: -1.00
OS_AXIS: 090
OS_SPHERE: +2.25

## 2025-07-10 ASSESSMENT — KERATOMETRY
OD_K2POWER_DIOPTERS: 42.00
OD_AXISANGLE_DEGREES: 090
METHOD_AUTO_MANUAL: AUTO
OS_K1POWER_DIOPTERS: 42.00
OS_AXISANGLE_DEGREES: 090
OD_K1POWER_DIOPTERS: 42.00
OS_K2POWER_DIOPTERS: 42.00

## 2025-07-10 ASSESSMENT — REFRACTION_AUTOREFRACTION
OD_AXIS: 079
OS_SPHERE: +3.00
OS_AXIS: 104
OS_CYLINDER: -1.25
OD_CYLINDER: -1.25
OD_SPHERE: -0.25

## 2025-07-10 ASSESSMENT — CONFRONTATIONAL VISUAL FIELD TEST (CVF)
OS_FINDINGS: FULL
OD_FINDINGS: FULL

## 2025-07-10 ASSESSMENT — VISUAL ACUITY
OS_BCVA: 20/20-2
OD_BCVA: 20/30-2

## 2025-07-10 ASSESSMENT — REFRACTION_CURRENTRX
OD_SPHERE: +2.00
OS_SPHERE: +2.00
OD_OVR_VA: 20/
OD_VPRISM_DIRECTION: SV
OS_OVR_VA: 20/
OS_VPRISM_DIRECTION: SV

## 2025-07-10 ASSESSMENT — LID POSITION - DERMATOCHALASIS
OS_DERMATOCHALASIS: LUL 1+
OD_DERMATOCHALASIS: RUL 1+

## 2025-07-10 ASSESSMENT — TONOMETRY: OD_IOP_MMHG: 21

## 2025-09-02 ENCOUNTER — APPOINTMENT (OUTPATIENT)
Dept: INTERNAL MEDICINE | Facility: CLINIC | Age: 70
End: 2025-09-02
Payer: MEDICARE

## 2025-09-02 VITALS
HEART RATE: 66 BPM | SYSTOLIC BLOOD PRESSURE: 128 MMHG | DIASTOLIC BLOOD PRESSURE: 79 MMHG | BODY MASS INDEX: 27.88 KG/M2 | WEIGHT: 142 LBS | OXYGEN SATURATION: 97 % | HEIGHT: 60 IN

## 2025-09-02 DIAGNOSIS — N84.0 POLYP OF CORPUS UTERI: ICD-10-CM

## 2025-09-02 DIAGNOSIS — E78.5 HYPERLIPIDEMIA, UNSPECIFIED: ICD-10-CM

## 2025-09-02 DIAGNOSIS — I10 ESSENTIAL (PRIMARY) HYPERTENSION: ICD-10-CM

## 2025-09-02 PROCEDURE — G2211 COMPLEX E/M VISIT ADD ON: CPT

## 2025-09-02 PROCEDURE — 99213 OFFICE O/P EST LOW 20 MIN: CPT

## 2025-09-17 ENCOUNTER — APPOINTMENT (OUTPATIENT)
Dept: INTERNAL MEDICINE | Facility: CLINIC | Age: 70
End: 2025-09-17
Payer: MEDICARE

## 2025-09-17 VITALS
WEIGHT: 140 LBS | OXYGEN SATURATION: 98 % | DIASTOLIC BLOOD PRESSURE: 79 MMHG | SYSTOLIC BLOOD PRESSURE: 126 MMHG | HEIGHT: 60 IN | BODY MASS INDEX: 27.48 KG/M2 | HEART RATE: 64 BPM

## 2025-09-17 DIAGNOSIS — R22.0 LOCALIZED SWELLING, MASS AND LUMP, HEAD: ICD-10-CM

## 2025-09-17 DIAGNOSIS — N84.0 POLYP OF CORPUS UTERI: ICD-10-CM

## 2025-09-17 PROCEDURE — 99213 OFFICE O/P EST LOW 20 MIN: CPT

## 2025-09-17 PROCEDURE — G2211 COMPLEX E/M VISIT ADD ON: CPT
